# Patient Record
(demographics unavailable — no encounter records)

---

## 2025-02-24 NOTE — REASON FOR VISIT
[Consultation] : a consultation visit [Parents] : parents [Patient] : patient [Mother] : mother [FreeTextEntry1] : short stature

## 2025-02-24 NOTE — HISTORY OF PRESENT ILLNESS
[FreeTextEntry2] : Donna is a 13-year-old female referred by Dr. Partida for evaluation of short stature.   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 Last seen in 2018. Stopped coming for appointments due to difficult commute.   Review of the growth chart provided by pediatrician's office showed that Donna has been growing between 25% and 50% for height till 11-year-old when her height plateaued.   Donna got her first menstrual period at 9.5-10-year-old. She has been having regular menses.  She denied severe headaches, vision changes, hair loss, dry skin, temperature intolerance.  Shoe size 7.   Mother reports that Donna is the shortest in the family.  She reports that her nieces grew after age 14.   Donna herself not bothered by her height.    [Regular Periods] : regular periods [FreeTextEntry1] : Menarche at 9.5-11yo; LMP January 2025

## 2025-02-24 NOTE — FAMILY HISTORY
[___ inches] : [unfilled] inches [de-identified] :  from [place] [FreeTextEntry1] : from [place] [FreeTextEntry5] : 10-10 yo [FreeTextEntry4] : MGM 5'5'', MGF 5'8''; PGM 5'4'', PGF 6'2'', paternal uncles 6 ft [FreeTextEntry2] : 17 yo brother 6ft

## 2025-02-24 NOTE — PHYSICAL EXAM
[Healthy Appearing] : healthy appearing [Overweight] : overweight [Acanthosis Nigricans___] : acanthosis nigricans over [unfilled] [Normal Appearance] : normal appearance [Well formed] : well formed [Normally Set] : normally set [WNL for age] : within normal limits of age [Goiter] : no goiter [None] : there were no thyroid nodules [Normal S1 and S2] : normal S1 and S2 [Murmur] : no murmurs [Clear to Ausculation Bilaterally] : clear to auscultation bilaterally [Abdomen Soft] : soft [Abdomen Tenderness] : non-tender [] : no hepatosplenomegaly [5] : was Vincent stage 5 [Moderate] : moderate [Vincent Stage ___] : the Vincent stage for breast development was [unfilled] [Normal] : normal

## 2025-02-24 NOTE — CONSULT LETTER
[Dear  ___] : Dear  [unfilled], [Consult Letter:] : I had the pleasure of evaluating your patient, [unfilled]. [Please see my note below.] : Please see my note below. [Consult Closing:] : Thank you very much for allowing me to participate in the care of this patient.  If you have any questions, please do not hesitate to contact me. [Sincerely,] : Sincerely, [FreeTextEntry3] : Kaylen Garcia MD Pediatric Endocrinologist [facility]

## 2025-02-24 NOTE — REVIEW OF SYSTEMS
[Change in Activity] : no change in activity [Back Pain] : ~T no back pain [Chest Pain] : no chest pain or discomfort [Cough] : no cough [Change in Appetite] : no change in appetite [Abdominal Pain] : no abdominal pain [Sleep Disturbances] : ~T no sleep disturbances [Cold Intolerance] : cold tolerant [Heat Intolerance] : heat tolerant

## 2025-02-24 NOTE — ASSESSMENT
[FreeTextEntry1] : 13 year 8-month-old female with hx premature adrenarche diagnosed at 7 yo, who was lost to follow-up, now presenting due to concerns re: short stature. Patient is more than 3 years post menarche, therefore likely achieved her near adult height. Discussed with the mother and patient that growth promoting therapy is contraindicated when epiphyses are fused.    Bone age to confirm, fusion of growth plates.  Will contact mother to discuss results.